# Patient Record
Sex: FEMALE | Race: WHITE | NOT HISPANIC OR LATINO | Employment: FULL TIME | URBAN - NONMETROPOLITAN AREA
[De-identification: names, ages, dates, MRNs, and addresses within clinical notes are randomized per-mention and may not be internally consistent; named-entity substitution may affect disease eponyms.]

---

## 2017-12-06 ENCOUNTER — TRANSCRIBE ORDERS (OUTPATIENT)
Dept: ADMINISTRATIVE | Facility: HOSPITAL | Age: 62
End: 2017-12-06

## 2017-12-06 DIAGNOSIS — Z12.31 ENCOUNTER FOR SCREENING MAMMOGRAM FOR MALIGNANT NEOPLASM OF BREAST: Primary | ICD-10-CM

## 2017-12-06 DIAGNOSIS — Z13.820 SCREENING FOR OSTEOPOROSIS: ICD-10-CM

## 2017-12-21 ENCOUNTER — HOSPITAL ENCOUNTER (OUTPATIENT)
Dept: MAMMOGRAPHY | Facility: HOSPITAL | Age: 62
Discharge: HOME | End: 2017-12-21
Attending: OBSTETRICS & GYNECOLOGY | Admitting: OBSTETRICS & GYNECOLOGY

## 2017-12-21 ENCOUNTER — HOSPITAL ENCOUNTER (OUTPATIENT)
Dept: BONE DENSITY | Facility: HOSPITAL | Age: 62
Discharge: HOME | End: 2017-12-21
Attending: OBSTETRICS & GYNECOLOGY

## 2017-12-21 DIAGNOSIS — Z13.820 SCREENING FOR OSTEOPOROSIS: ICD-10-CM

## 2017-12-21 DIAGNOSIS — Z12.31 ENCOUNTER FOR SCREENING MAMMOGRAM FOR MALIGNANT NEOPLASM OF BREAST: ICD-10-CM

## 2017-12-21 PROCEDURE — G0202 SCR MAMMO BI INCL CAD: HCPCS

## 2017-12-21 PROCEDURE — 77063 BREAST TOMOSYNTHESIS BI: CPT

## 2017-12-21 PROCEDURE — 77080 DXA BONE DENSITY AXIAL: CPT

## 2018-12-20 ENCOUNTER — OFFICE VISIT (OUTPATIENT)
Dept: OTOLARYNGOLOGY | Facility: CLINIC | Age: 63
End: 2018-12-20

## 2018-12-20 VITALS
WEIGHT: 137 LBS | BODY MASS INDEX: 25.21 KG/M2 | DIASTOLIC BLOOD PRESSURE: 90 MMHG | SYSTOLIC BLOOD PRESSURE: 158 MMHG | HEIGHT: 62 IN | HEART RATE: 78 BPM | TEMPERATURE: 97.7 F

## 2018-12-20 DIAGNOSIS — K21.9 LARYNGOPHARYNGEAL REFLUX (LPR): Primary | ICD-10-CM

## 2018-12-20 DIAGNOSIS — R07.0 THROAT PAIN: ICD-10-CM

## 2018-12-20 DIAGNOSIS — R09.89 GLOBUS SENSATION: ICD-10-CM

## 2018-12-20 PROCEDURE — 99203 OFFICE O/P NEW LOW 30 MIN: CPT | Performed by: NURSE PRACTITIONER

## 2018-12-20 PROCEDURE — 31575 DIAGNOSTIC LARYNGOSCOPY: CPT | Performed by: NURSE PRACTITIONER

## 2018-12-20 RX ORDER — LEVOTHYROXINE SODIUM 0.1 MG/1
TABLET ORAL
COMMUNITY
Start: 2018-11-30 | End: 2020-05-05 | Stop reason: DRUGHIGH

## 2018-12-20 RX ORDER — OMEPRAZOLE 40 MG/1
CAPSULE, DELAYED RELEASE ORAL
Qty: 30 CAPSULE | Refills: 11 | Status: SHIPPED | OUTPATIENT
Start: 2018-12-20 | End: 2020-05-05 | Stop reason: ALTCHOICE

## 2018-12-20 NOTE — PROGRESS NOTES
YOB: 1955  Location: [facility]  Location Address: [address] [place] [address], [facility], [address] [place] [address]  Location Phone: [phone]    Chief Complaint   Patient presents with   • Difficulty Swallowing       History of Present Illness  Marely Luis is a 63 y.o. female.  Marely Luis is here for evaluation of ENT complaints. The patient has had problems with throat complaints, a sore throat and throat pain  The symptoms are not localized to a particular location. The patient has had moderate symptoms. The symptoms have been present for the last several months The symptoms are aggravated by  no identifiable factors. The symptoms are improved by no identifiable factors. She had a tooth pulled then started on Augmentin. She has developed since then a lot of throat irritation on left side.  She has a lengthy history of reflux and is off meds at this time.  Hx of endo last one two years ago Teja.  She denies any other obvious symptoms at this time.       Past Medical History:   Diagnosis Date   • Hypothyroid    • Irregular heart beat        Past Surgical History:   Procedure Laterality Date   • THYROID SURGERY      white   • TOOTH EXTRACTION         Outpatient Medications Marked as Taking for the 18 encounter (Office Visit) with Valencia Mcnamara APRN   Medication Sig Dispense Refill   • levothyroxine (SYNTHROID, LEVOTHROID) 100 MCG tablet          Quinidine and Sulfa antibiotics    Family History   Problem Relation Age of Onset   • Breast cancer Neg Hx        Social History     Socioeconomic History   • Marital status:      Spouse name: Not on file   • Number of children: Not on file   • Years of education: Not on file   • Highest education level: Not on file   Social Needs   • Financial resource strain: Not on file   • Food insecurity - worry: Not on file   • Food insecurity - inability: Not on file   • Transportation needs - medical: Not on file   • Transportation needs - non-medical: Not on  file   Occupational History   • Not on file   Tobacco Use   • Smoking status: Never Smoker   • Smokeless tobacco: Never Used   Substance and Sexual Activity   • Alcohol use: Yes   • Drug use: Defer   • Sexual activity: Not on file   Other Topics Concern   • Not on file   Social History Narrative   • Not on file       Review of Systems   Constitutional: Negative.    HENT:        SEE HPI   Eyes: Negative.    Respiratory: Negative.    Cardiovascular: Negative.    Gastrointestinal: Negative.    Endocrine: Negative.    Genitourinary: Negative.    Musculoskeletal: Negative.    Skin: Negative.    Allergic/Immunologic: Negative.    Neurological: Negative.    Hematological: Negative.    Psychiatric/Behavioral: Negative.        Vitals:    12/20/18 1403   BP: 158/90   Pulse: 78   Temp: 97.7 °F (36.5 °C)       Body mass index is 25.06 kg/m².    Objective     Physical Exam  CONSTITUTIONAL: well nourished, alert, oriented, in no acute distress     COMMUNICATION AND VOICE: able to communicate normally, normal voice quality    HEAD: normocephalic, no lesions, atraumatic, no tenderness, no masses     FACE: appearance normal, no lesions, no tenderness, no deformities, facial motion symmetric    SALIVARY GLANDS: parotid glands with no tenderness, no swelling, no masses, submandibular glands with normal size, nontender    EYES: ocular motility normal, eyelids normal, orbits normal, no proptosis, conjunctiva normal , pupils equal, round     EARS:  Hearing: response to conversational voice normal bilaterally   External Ears: auricles without lesions  Otoscopic: tympanic membrane appearance normal, no lesions, no perforation, normal mobility, no fluid    NOSE:  External Nose: structure normal, no tenderness on palpation, no nasal discharge, no lesions, no evidence of trauma, nostrils patent   Intranasal Exam: nasal mucosa normal, vestibule within normal limits, inferior turbinate normal, nasal septum midline     ORAL:  Lips: upper and  lower lips without lesion   Teeth: dentition within normal limits for age   Gums: gingivae healthy   Oral Mucosa: oral mucosa normal, no mucosal lesions   Floor of Mouth: Warthin’s duct patent, mucosa normal  Tongue: lingual mucosa normal without lesions, normal tongue mobility   Palate: soft and hard palates with normal mucosa and structure  Oropharynx: noted cryptic tonsils 2+ no obvious exudate or grossly obvious abnormalities    HYPOPHARYNX:   LARYNX: see scope    NECK: neck appearance normal, no mass,  noted without erythema or tenderness    LYMPH NODES: no lymphadenopathy    CHEST/RESPIRATORY: respiratory effort normal,    CARDIOVASCULAR: extremities without cyanosis or edema      NEUROLOGIC/PSYCHIATRIC: oriented to time, place and person, mood normal, affect appropriate, CN II-XII intact grossly    Assessment/Plan   Marely was seen today for difficulty swallowing.    Diagnoses and all orders for this visit:    Laryngopharyngeal reflux (LPR)    Globus sensation    Throat pain    Other orders  -     omeprazole (priLOSEC) 40 MG capsule; Take 40 mg by mouth 30 minute prior to breakfast      * Surgery not found *  No orders of the defined types were placed in this encounter.    Return in about 3 months (around 3/20/2019).       Patient Instructions   Gastroesophageal Reflux Disease, Adult  Normally, food travels down the esophagus and stays in the stomach to be digested. However, when a person has gastroesophageal reflux disease (GERD), food and stomach acid move back up into the esophagus. When this happens, the esophagus becomes sore and inflamed. Over time, GERD can create small holes (ulcers) in the lining of the esophagus.  What are the causes?  This condition is caused by a problem with the muscle between the esophagus and the stomach (lower esophageal sphincter, or LES). Normally, the LES muscle closes after food passes through the esophagus to the stomach. When the LES is weakened or abnormal, it does not  close properly, and that allows food and stomach acid to go back up into the esophagus. The LES can be weakened by certain dietary substances, medicines, and medical conditions, including:  · Tobacco use.  · Pregnancy.  · Having a hiatal hernia.  · Heavy alcohol use.  · Certain foods and beverages, such as coffee, chocolate, onions, and peppermint.    What increases the risk?  This condition is more likely to develop in:  · People who have an increased body weight.  · People who have connective tissue disorders.  · People who use NSAID medicines.    What are the signs or symptoms?  Symptoms of this condition include:  · Heartburn.  · Difficult or painful swallowing.  · The feeling of having a lump in the throat.  · A bitter taste in the mouth.  · Bad breath.  · Having a large amount of saliva.  · Having an upset or bloated stomach.  · Belching.  · Chest pain.  · Shortness of breath or wheezing.  · Ongoing (chronic) cough or a night-time cough.  · Wearing away of tooth enamel.  · Weight loss.    Different conditions can cause chest pain. Make sure to see your health care provider if you experience chest pain.  How is this diagnosed?  Your health care provider will take a medical history and perform a physical exam. To determine if you have mild or severe GERD, your health care provider may also monitor how you respond to treatment. You may also have other tests, including:  · An endoscopy to examine your stomach and esophagus with a small camera.  · A test that measures the acidity level in your esophagus.  · A test that measures how much pressure is on your esophagus.  · A barium swallow or modified barium swallow to show the shape, size, and functioning of your esophagus.    How is this treated?  The goal of treatment is to help relieve your symptoms and to prevent complications. Treatment for this condition may vary depending on how severe your symptoms are. Your health care provider may recommend:  · Changes to  your diet.  · Medicine.  · Surgery.    Follow these instructions at home:  Diet  · Follow a diet as recommended by your health care provider. This may involve avoiding foods and drinks such as:  ? Coffee and tea (with or without caffeine).  ? Drinks that contain alcohol.  ? Energy drinks and sports drinks.  ? Carbonated drinks or sodas.  ? Chocolate and cocoa.  ? Peppermint and mint flavorings.  ? Garlic and onions.  ? Horseradish.  ? Spicy and acidic foods, including peppers, chili powder, chong powder, vinegar, hot sauces, and barbecue sauce.  ? Citrus fruit juices and citrus fruits, such as oranges, judith, and limes.  ? Tomato-based foods, such as red sauce, chili, salsa, and pizza with red sauce.  ? Fried and fatty foods, such as donuts, french fries, potato chips, and high-fat dressings.  ? High-fat meats, such as hot dogs and fatty cuts of red and white meats, such as rib eye steak, sausage, ham, and bui.  ? High-fat dairy items, such as whole milk, butter, and cream cheese.  · Eat small, frequent meals instead of large meals.  · Avoid drinking large amounts of liquid with your meals.  · Avoid eating meals during the 2-3 hours before bedtime.  · Avoid lying down right after you eat.  · Do not exercise right after you eat.  General instructions  · Pay attention to any changes in your symptoms.  · Take over-the-counter and prescription medicines only as told by your health care provider. Do not take aspirin, ibuprofen, or other NSAIDs unless your health care provider told you to do so.  · Do not use any tobacco products, including cigarettes, chewing tobacco, and e-cigarettes. If you need help quitting, ask your health care provider.  · Wear loose-fitting clothing. Do not wear anything tight around your waist that causes pressure on your abdomen.  · Raise (elevate) the head of your bed 6 inches (15cm).  · Try to reduce your stress, such as with yoga or meditation. If you need help reducing stress, ask your  health care provider.  · If you are overweight, reduce your weight to an amount that is healthy for you. Ask your health care provider for guidance about a safe weight loss goal.  · Keep all follow-up visits as told by your health care provider. This is important.  Contact a health care provider if:  · You have new symptoms.  · You have unexplained weight loss.  · You have difficulty swallowing, or it hurts to swallow.  · You have wheezing or a persistent cough.  · Your symptoms do not improve with treatment.  · You have a hoarse voice.  Get help right away if:  · You have pain in your arms, neck, jaw, teeth, or back.  · You feel sweaty, dizzy, or light-headed.  · You have chest pain or shortness of breath.  · You vomit and your vomit looks like blood or coffee grounds.  · You faint.  · Your stool is bloody or black.  · You cannot swallow, drink, or eat.  This information is not intended to replace advice given to you by your health care provider. Make sure you discuss any questions you have with your health care provider.  Document Released: 09/27/2006 Document Revised: 05/17/2017 Document Reviewed: 04/13/2016  [employer] Interactive Patient Education © 2018 [employer]

## 2018-12-20 NOTE — PATIENT INSTRUCTIONS
Gastroesophageal Reflux Disease, Adult  Normally, food travels down the esophagus and stays in the stomach to be digested. However, when a person has gastroesophageal reflux disease (GERD), food and stomach acid move back up into the esophagus. When this happens, the esophagus becomes sore and inflamed. Over time, GERD can create small holes (ulcers) in the lining of the esophagus.  What are the causes?  This condition is caused by a problem with the muscle between the esophagus and the stomach (lower esophageal sphincter, or LES). Normally, the LES muscle closes after food passes through the esophagus to the stomach. When the LES is weakened or abnormal, it does not close properly, and that allows food and stomach acid to go back up into the esophagus. The LES can be weakened by certain dietary substances, medicines, and medical conditions, including:  · Tobacco use.  · Pregnancy.  · Having a hiatal hernia.  · Heavy alcohol use.  · Certain foods and beverages, such as coffee, chocolate, onions, and peppermint.    What increases the risk?  This condition is more likely to develop in:  · People who have an increased body weight.  · People who have connective tissue disorders.  · People who use NSAID medicines.    What are the signs or symptoms?  Symptoms of this condition include:  · Heartburn.  · Difficult or painful swallowing.  · The feeling of having a lump in the throat.  · A bitter taste in the mouth.  · Bad breath.  · Having a large amount of saliva.  · Having an upset or bloated stomach.  · Belching.  · Chest pain.  · Shortness of breath or wheezing.  · Ongoing (chronic) cough or a night-time cough.  · Wearing away of tooth enamel.  · Weight loss.    Different conditions can cause chest pain. Make sure to see your health care provider if you experience chest pain.  How is this diagnosed?  Your health care provider will take a medical history and perform a physical exam. To determine if you have mild or severe  GERD, your health care provider may also monitor how you respond to treatment. You may also have other tests, including:  · An endoscopy to examine your stomach and esophagus with a small camera.  · A test that measures the acidity level in your esophagus.  · A test that measures how much pressure is on your esophagus.  · A barium swallow or modified barium swallow to show the shape, size, and functioning of your esophagus.    How is this treated?  The goal of treatment is to help relieve your symptoms and to prevent complications. Treatment for this condition may vary depending on how severe your symptoms are. Your health care provider may recommend:  · Changes to your diet.  · Medicine.  · Surgery.    Follow these instructions at home:  Diet  · Follow a diet as recommended by your health care provider. This may involve avoiding foods and drinks such as:  ? Coffee and tea (with or without caffeine).  ? Drinks that contain alcohol.  ? Energy drinks and sports drinks.  ? Carbonated drinks or sodas.  ? Chocolate and cocoa.  ? Peppermint and mint flavorings.  ? Garlic and onions.  ? Horseradish.  ? Spicy and acidic foods, including peppers, chili powder, chong powder, vinegar, hot sauces, and barbecue sauce.  ? Citrus fruit juices and citrus fruits, such as oranges, judith, and limes.  ? Tomato-based foods, such as red sauce, chili, salsa, and pizza with red sauce.  ? Fried and fatty foods, such as donuts, french fries, potato chips, and high-fat dressings.  ? High-fat meats, such as hot dogs and fatty cuts of red and white meats, such as rib eye steak, sausage, ham, and bui.  ? High-fat dairy items, such as whole milk, butter, and cream cheese.  · Eat small, frequent meals instead of large meals.  · Avoid drinking large amounts of liquid with your meals.  · Avoid eating meals during the 2-3 hours before bedtime.  · Avoid lying down right after you eat.  · Do not exercise right after you eat.  General  instructions  · Pay attention to any changes in your symptoms.  · Take over-the-counter and prescription medicines only as told by your health care provider. Do not take aspirin, ibuprofen, or other NSAIDs unless your health care provider told you to do so.  · Do not use any tobacco products, including cigarettes, chewing tobacco, and e-cigarettes. If you need help quitting, ask your health care provider.  · Wear loose-fitting clothing. Do not wear anything tight around your waist that causes pressure on your abdomen.  · Raise (elevate) the head of your bed 6 inches (15cm).  · Try to reduce your stress, such as with yoga or meditation. If you need help reducing stress, ask your health care provider.  · If you are overweight, reduce your weight to an amount that is healthy for you. Ask your health care provider for guidance about a safe weight loss goal.  · Keep all follow-up visits as told by your health care provider. This is important.  Contact a health care provider if:  · You have new symptoms.  · You have unexplained weight loss.  · You have difficulty swallowing, or it hurts to swallow.  · You have wheezing or a persistent cough.  · Your symptoms do not improve with treatment.  · You have a hoarse voice.  Get help right away if:  · You have pain in your arms, neck, jaw, teeth, or back.  · You feel sweaty, dizzy, or light-headed.  · You have chest pain or shortness of breath.  · You vomit and your vomit looks like blood or coffee grounds.  · You faint.  · Your stool is bloody or black.  · You cannot swallow, drink, or eat.  This information is not intended to replace advice given to you by your health care provider. Make sure you discuss any questions you have with your health care provider.  Document Released: 09/27/2006 Document Revised: 05/17/2017 Document Reviewed: 04/13/2016  [employer] Interactive Patient Education © 2018 [employer]

## 2019-10-17 ENCOUNTER — TRANSCRIBE ORDERS (OUTPATIENT)
Dept: ADMINISTRATIVE | Facility: HOSPITAL | Age: 64
End: 2019-10-17

## 2019-10-17 DIAGNOSIS — R51.9 NONINTRACTABLE HEADACHE, UNSPECIFIED CHRONICITY PATTERN, UNSPECIFIED HEADACHE TYPE: Primary | ICD-10-CM

## 2019-10-21 ENCOUNTER — HOSPITAL ENCOUNTER (OUTPATIENT)
Dept: MRI IMAGING | Facility: HOSPITAL | Age: 64
Discharge: HOME | End: 2019-10-21
Admitting: FAMILY MEDICINE

## 2019-10-21 PROCEDURE — 70551 MRI BRAIN STEM W/O DYE: CPT

## 2020-05-05 ENCOUNTER — OFFICE VISIT (OUTPATIENT)
Dept: OTOLARYNGOLOGY | Facility: CLINIC | Age: 65
End: 2020-05-05

## 2020-05-05 ENCOUNTER — TELEPHONE (OUTPATIENT)
Dept: OTOLARYNGOLOGY | Facility: CLINIC | Age: 65
End: 2020-05-05

## 2020-05-05 VITALS — WEIGHT: 124.8 LBS | TEMPERATURE: 97.3 F | BODY MASS INDEX: 22.97 KG/M2 | HEIGHT: 62 IN

## 2020-05-05 DIAGNOSIS — J35.01 CHRONIC TONSILLITIS: ICD-10-CM

## 2020-05-05 DIAGNOSIS — K21.9 LARYNGOPHARYNGEAL REFLUX (LPR): Primary | ICD-10-CM

## 2020-05-05 DIAGNOSIS — R09.89 GLOBUS SENSATION: ICD-10-CM

## 2020-05-05 DIAGNOSIS — R07.0 THROAT PAIN: ICD-10-CM

## 2020-05-05 DIAGNOSIS — J30.0 VASOMOTOR RHINITIS: ICD-10-CM

## 2020-05-05 PROCEDURE — 31575 DIAGNOSTIC LARYNGOSCOPY: CPT | Performed by: OTOLARYNGOLOGY

## 2020-05-05 PROCEDURE — 99203 OFFICE O/P NEW LOW 30 MIN: CPT | Performed by: OTOLARYNGOLOGY

## 2020-05-05 RX ORDER — PANTOPRAZOLE SODIUM 40 MG/1
40 TABLET, DELAYED RELEASE ORAL DAILY
COMMUNITY
Start: 2020-05-01

## 2020-05-05 RX ORDER — SUCRALFATE 1 G/1
1 TABLET ORAL
Qty: 60 TABLET | Refills: 1 | Status: SHIPPED | OUTPATIENT
Start: 2020-05-05

## 2020-05-05 RX ORDER — LEVOTHYROXINE SODIUM 88 UG/1
88 TABLET ORAL
COMMUNITY
Start: 2020-01-22

## 2020-05-05 RX ORDER — SUCRALFATE 1 G/1
1 TABLET ORAL 4 TIMES DAILY
Qty: 60 TABLET | Refills: 1 | Status: SHIPPED | OUTPATIENT
Start: 2020-05-05 | End: 2020-05-05 | Stop reason: ALTCHOICE

## 2020-05-05 RX ORDER — AMOXICILLIN AND CLAVULANATE POTASSIUM 875; 125 MG/1; MG/1
1 TABLET, FILM COATED ORAL EVERY 12 HOURS
Qty: 20 TABLET | Refills: 0 | Status: SHIPPED | OUTPATIENT
Start: 2020-05-05 | End: 2020-05-07 | Stop reason: ALTCHOICE

## 2020-05-05 RX ORDER — IPRATROPIUM BROMIDE 42 UG/1
2 SPRAY, METERED NASAL 3 TIMES DAILY
Qty: 15 ML | Refills: 5 | Status: SHIPPED | OUTPATIENT
Start: 2020-05-05

## 2020-05-05 NOTE — PROGRESS NOTES
Subjective   Marely Luis is a 64 y.o. female.   Throat problem  History of Present Illness   Patient is had a problem with her throat for couple of months she has been treated with Anaprox will just last few days is starting to help she has been on Prilosec in the past she has a history of thyroid problems are treated by others she had thyroid surgery a few years ago ENT in another town.  She recently went to the emergency room because her symptoms were bothered her so much after she had had sore throat which she says worse on the right and was had a CBC and thyroid testing which is slightly normal abnormal the CBC was normal she had a CT scan of the neck which was normal as well she has discomfort more on the right side though is not severe she is not having hemoptysis she also has postnasal drip she is to start on Protonix she was given doxycycline which exacerbated her reflux symptoms she describes fullness in her throat no fever chills no adenopathy occasional raspiness but no severe sore throat throat associated with voice problems      The following portions of the patient's history were reviewed and updated as appropriate: allergies, current medications, past family history, past medical history, past social history, past surgical history and problem list.      Marely Luis reports that she has never smoked. She has never used smokeless tobacco. She reports that she drinks alcohol. She reports that she does not use drugs.  Patient is not a tobacco user and has not been counseled for use of tobacco products    Family History   Problem Relation Age of Onset   • Diabetes Other    • Heart disease Other    • Thyroid disease Other    • Breast cancer Neg Hx          Current Outpatient Medications:   •  levothyroxine (SYNTHROID, LEVOTHROID) 88 MCG tablet, Take 88 mcg by mouth., Disp: , Rfl:   •  pantoprazole (PROTONIX) 40 MG EC tablet, Take 40 mg by mouth Daily., Disp: , Rfl:   •  amoxicillin-clavulanate (AUGMENTIN)  875-125 MG per tablet, Take 1 tablet by mouth Every 12 (Twelve) Hours., Disp: 20 tablet, Rfl: 0  •  ipratropium (ATROVENT) 0.06 % nasal spray, 2 sprays into the nostril(s) as directed by provider 3 (Three) Times a Day., Disp: 15 mL, Rfl: 5  •  sucralfate (CARAFATE) 1 g tablet, Take 1 tablet by mouth 4 (Four) Times a Day., Disp: 60 tablet, Rfl: 1    Allergies   Allergen Reactions   • Quinidine Arrhythmia   • Sulfa Antibiotics Arrhythmia       Past Medical History:   Diagnosis Date   • GERD (gastroesophageal reflux disease)    • Globus sensation    • History of blood clots    • Hypothyroid    • Irregular heart beat    • Laryngopharyngeal reflux (LPR)        Past Surgical History:   Procedure Laterality Date   • POLYPECTOMY     • THYROID SURGERY      white   • TOOTH EXTRACTION         Review of Systems   Constitutional: Negative for fever.   HENT: Positive for ear pain, postnasal drip, sore throat, trouble swallowing and voice change.         Continuous Burping    Respiratory: Negative for stridor.    Cardiovascular: Negative for chest pain.   Endocrine: Positive for cold intolerance.   Musculoskeletal: Positive for neck pain.   Neurological: Positive for numbness.   Hematological: Negative for adenopathy.   All other systems reviewed and are negative.          Objective   Physical Exam   Constitutional: She appears well-developed and well-nourished.   HENT:   Head: Normocephalic.   Right Ear: Hearing, tympanic membrane, external ear and ear canal normal.   Left Ear: Hearing, tympanic membrane, external ear and ear canal normal.   Ears:    Nose: Mucosal edema present.   Mouth/Throat: Uvula is midline, oropharynx is clear and moist and mucous membranes are normal. Tonsils are 3+ on the right. Tonsils are 1+ on the left. No tonsillar exudate.       Neck: Trachea normal and phonation normal. Neck supple. No tracheal tenderness present. No tracheal deviation present. No thyromegaly present.       Pulmonary/Chest: Effort  normal.   Musculoskeletal: Normal range of motion.   Lymphadenopathy:     She has no cervical adenopathy.   Neurological: She is alert.   Skin: Skin is warm.   Psychiatric: She has a normal mood and affect. Thought content normal.   Nursing note and vitals reviewed.    CT report was reviewed there is no copy since is an outside institution there is no abnormality noted including adenopathy mass or asymmetry    Procedure Note    Pre-operative Diagnosis:   Chief Complaint   Patient presents with   • Sore Throat   • Fullness Sensation In Throat   • Post Nasal Drip       Post-operative Diagnosis: same    Anesthesia: topical with xylocaine and neosynephrine    Endoscopy Type:  Flexible Laryngoscopy    Procedure Details:    The patient was placed in the sitting position.  After topical anesthesia and decongestion, the 4 mm laryngoscope was passed.  The oropharynx, hypopharynx, and larynx were all examined.  Vocal cords were examined during respiration and phonation.  The following findings were noted:    Findings: Previously noted nasal findings were confirmed. Nasopharynx without mass, hypopharynx and larynx without evidence of neoplasm. Vocal cord mobility intact. There is chronic appearing edema and erythema of the laryngeal structures consistent with chronic laryngitis.  Right tonsil is larger than left with irritation and tenderness    Condition:  Stable.  Patient tolerated procedure well.    Complications:  None    Assessment/Plan   Marely was seen today for sore throat, fullness sensation in throat and post nasal drip.    Diagnoses and all orders for this visit:    Laryngopharyngeal reflux (LPR)    Globus sensation    Throat pain    Vasomotor rhinitis    Chronic tonsillitis    Other orders  -     amoxicillin-clavulanate (AUGMENTIN) 875-125 MG per tablet; Take 1 tablet by mouth Every 12 (Twelve) Hours.  -     ipratropium (ATROVENT) 0.06 % nasal spray; 2 sprays into the nostril(s) as directed by provider 3 (Three)  Times a Day.  -     sucralfate (CARAFATE) 1 g tablet; Take 1 tablet by mouth 4 (Four) Times a Day.    Think the patient has a combination of 3 problems we will treat her vasomotor rhinitis her right tonsillitis and add Carafate to her Protonix she not been on it long and if to treat her reflux  Discussed with her the importance of reflux precautions showed her how to use the medication discussed importance of probiotics how to take the antibiotic with food and the Protonix without food she is to see us back in 4 weeks if she has any questions or problems she is to let us know otherwise we will follow-up I reviewed the CT report from outside institution

## 2020-05-05 NOTE — TELEPHONE ENCOUNTER
05/05/2020, 1448 - Patient telephone call returned per this staff member [phone].  Patient offered Good RX Prescription Drug Savings Card due to patient request for more cost effective alternate prescription medication for Ipratropium (Atrovent) 0.06% Nasal Spray.  Patient accepted offer and requested savings card to be mailed due to lengthy drive required to return to clinic and collect savings card in person.  Patient mailing address confirmed - [address], [place] [address].  Savings card placed in mail as of today, Tuesday, May 5, 2020.  Patient requested clarification regarding amount of prescription medication Sucralfate (Carafate) 1 GM Tablet to utilize as prescription medication bottle states 1 tablet by mouth 4 times per day, however, these were not the instructions received per Dr. Ashish Guerra M.D./ENT.  Patient made aware she is to utilize 1 tablet by mouth nightly at bedtime, and may utilize a second dose after dinner if zero improvement in symptoms.  Patient verbalized understanding.    05/05/2020, 1453 - Patient's pharmacy of choice,  Pharmacy, [place], [place] telephoned per this staff member [phone].  Spoke with PharmacistLiborio.  Liborio made aware of incorrect instructions of 1 tablet by mouth 4 times per day placed on Sucralfate (Carafate) 1 GM Tablet prescription medication bottle.  Pharmacist made aware correct instructions is 1 tablet by mouth nightly at bedtime, with second dose after dinner if zero improvement in symptoms.  Pharmacist stated the prescription medication directions received per  Pharmacy is 1 tablet by mouth 4 times per day.  Pharmacist made aware 1 tablet by mouth 4 times per day directions discontinued per Dr. Ashish Guerra M.D./ENT with resubmission of corrected directions of 1 tablet by mouth nightly at bedtime, with second dose after dinner if zero improvement in symptoms.  Pharmacist stated alternate directions not received.  Pharmacist made aware of  receipt confirmed per pharmacy regarding correct directions on 05/05/2020 at 1444.  Pharmacist made aware this staff member has spoken with patient and provided clarification of how to correctly utilize prescription medication.  Pharmacist offered to provide patient with alternate label.  Patient made aware.

## 2020-05-05 NOTE — TELEPHONE ENCOUNTER
----- Message from Christie Cobian sent at 5/5/2020  1:52 PM CDT -----  Regarding: RX   Contact: [phone]  Pharmacy called about her nasal spray, they are wanting to know if they could substitute for something cheaper. [phone] Liborio     As I was typing this patient called with questions about her prescription carafate for 4 times a day. She was wanting to know if she understood to take it that many times a day.     Thank you

## 2020-05-05 NOTE — PATIENT INSTRUCTIONS
MyPlate from USDA    MyPlate is an outline of a general healthy diet based on the 2010 Dietary Guidelines for Americans, from the U.S. Department of Agriculture (USDA). It sets guidelines for how much food you should eat from each food group based on your age, sex, and level of physical activity.  What are tips for following MyPlate?  To follow MyPlate recommendations:  · Eat a wide variety of fruits and vegetables, grains, and protein foods.  · Serve smaller portions and eat less food throughout the day.  · Limit portion sizes to avoid overeating.  · Enjoy your food.  · Get at least 150 minutes of exercise every week. This is about 30 minutes each day, 5 or more days per week.  It can be difficult to have every meal look like MyPlate. Think about MyPlate as eating guidelines for an entire day, rather than each individual meal.  Fruits and vegetables  · Make half of your plate fruits and vegetables.  · Eat many different colors of fruits and vegetables each day.  · For a 2,000 calorie daily food plan, eat:  ? 2½ cups of vegetables every day.  ? 2 cups of fruit every day.  · 1 cup is equal to:  ? 1 cup raw or cooked vegetables.  ? 1 cup raw fruit.  ? 1 medium-sized orange, apple, or banana.  ? 1 cup 100% fruit or vegetable juice.  ? 2 cups raw leafy greens, such as lettuce, spinach, or kale.  ? ½ cup dried fruit.  Grains  · One fourth of your plate should be grains.  · Make at least half of the grains you eat each day whole grains.  · For a 2,000 calorie daily food plan, eat 6 oz of grains every day.  · 1 oz is equal to:  ? 1 slice bread.  ? 1 cup cereal.  ? ½ cup cooked rice, cereal, or pasta.  Protein  · One fourth of your plate should be protein.  · Eat a wide variety of protein foods, including meat, poultry, fish, eggs, beans, nuts, and tofu.  · For a 2,000 calorie daily food plan, eat 5½ oz of protein every day.  · 1 oz is equal to:  ? 1 oz meat, poultry, or fish.  ? ¼ cup cooked beans.  ? 1 egg.  ? ½ oz nuts  or seeds.  ? 1 Tbsp peanut butter.  Dairy  · Drink fat-free or low-fat (1%) milk.  · Eat or drink dairy as a side to meals.  · For a 2,000 calorie daily food plan, eat or drink 3 cups of dairy every day.  · 1 cup is equal to:  ? 1 cup milk, yogurt, cottage cheese, or soy milk (soy beverage).  ? 2 oz processed cheese.  ? 1½ oz natural cheese.  Fats, oils, salt, and sugars  · Only small amounts of oils are recommended.  · Avoid foods that are high in calories and low in nutritional value (empty calories), like foods high in fat or added sugars.  · Choose foods that are low in salt (sodium). Choose foods that have less than 140 milligrams (mg) of sodium per serving.  · Drink water instead of sugary drinks. Drink enough water each day to keep your urine pale yellow.  Where to find support  · Work with your health care provider or a nutrition specialist (dietitian) to develop a customized eating plan that is right for you.  · Download an godwin (mobile application) to help you track your daily food intake.  Where to find more information  · Go to ChooseMyPlate.gov for more information.  · Learn more and log your daily food intake according to MyPlate using USDA's SuperTracker: www.[employer].usda.gov  Summary  · MyPlate is a general guideline for healthy eating from the USDA. It is based on the 2010 Dietary Guidelines for Americans.  · In general, fruits and vegetables should take up ½ of your plate, grains should take up ¼ of your plate, and protein should take up ¼ of your plate.  This information is not intended to replace advice given to you by your health care provider. Make sure you discuss any questions you have with your health care provider.  Document Released: 01/06/2009 Document Revised: 03/19/2018 Document Reviewed: 03/19/2018  Patients Know Best Interactive Patient Education © 2020 [employer]

## 2020-05-06 ENCOUNTER — TELEPHONE (OUTPATIENT)
Dept: OTOLARYNGOLOGY | Facility: CLINIC | Age: 65
End: 2020-05-06

## 2020-05-06 NOTE — TELEPHONE ENCOUNTER
05/06/2020, 1335 - Patient telephoned per this staff member [phone] with notification of verbal response received per Dr. Ashish Guerra M.D./ENT regarding patient inquiry if prescription medicaiton Amoxicillin-Clavulanate (Augmentin) 875-125 Tablets, 1 tablet by mouth 2 times per day, #20, 0 renewals would be the culprit of the fatigue she is experiencing.  Per M.D./ENT Charlie, no, prescription medication would not be culprit of patient's fatigue, more than likely, it is the illness being treated with the prescription medication that would be the culprit of patient's fatigue.  Patient verbalized understanding and further stated she does not have a fever.

## 2020-05-06 NOTE — TELEPHONE ENCOUNTER
----- Message from Christie Cobian sent at 5/6/2020 12:51 PM CDT -----  Regarding: rx ?   Contact: [phone]  Wanted to ask a question about her rx augmentin? Wants to know if it will cause fatigue?   If you would please give her a call.     Thank you

## 2020-05-07 ENCOUNTER — TELEPHONE (OUTPATIENT)
Dept: OTOLARYNGOLOGY | Facility: CLINIC | Age: 65
End: 2020-05-07

## 2020-05-07 RX ORDER — CEFDINIR 300 MG/1
300 CAPSULE ORAL 2 TIMES DAILY
Qty: 20 CAPSULE | Refills: 0 | Status: SHIPPED | OUTPATIENT
Start: 2020-05-07

## 2020-05-07 NOTE — TELEPHONE ENCOUNTER
"05/07/2020, 1149 - Patient telephone call returned per this staff member [phone] regarding patient inquiry if she may have inability to utilize antibiotics as she utilized Doxycycline \"a few weeks ago\" which resulted in sore throat, and now she is unable to utilize Augmentin due to shortness of breath.  Patient made aware of verbal response received per Dr. Ashish Guerra M.D./ENT - Doxycycline and Augmentin are 2 different drugs; Cefdinir (Omnicef) 300 MG Capsules, 1 capsule by mouth 2 times per day and Sucralfate (Carafate) 1 GM Tablets, 1 tablet by mouth nightly at bedtime with second dose after dinner if zero improvement in symptoms are his recommendations as treatment for patient's diagnoses.  Patient instructed to contact office should she experience reaction with Omnicef.  Patient made aware of need to include addition of an over the counter probiotic of patient's choice and/or consumption of yogurt while utilizing antibiotics to promote continued good health of gastrointestinal tract. Patient verbalized understanding.    "

## 2020-05-07 NOTE — TELEPHONE ENCOUNTER
----- Message from Adrianna Chang sent at 5/7/2020  8:23 AM CDT -----  Contact: [phone]  Having trouble taking Augmentin and Carafate.  She thinks it is mainly the Augmentin.  Asking if there was anything else she could take.    [facility].

## 2020-05-07 NOTE — TELEPHONE ENCOUNTER
"05/07/2020, 0858 - Patient telephoned per this staff member [phone] with request for clarification regarding patient statement \"having trouble taking Augmentin and Carafate\".  Patient stated she is experiencing difficulty in swallowing prescription medications, shortness of breath between the hours of 7:00 P.M. - 10:00 P.M. after utilizing Augmentin following evening meal, and reflux at 11:00 P.M. after utilizing Carafate 1 hour prior to sleep with belching.  Patient stated she is sleeping in [place]. Patient stated throat is not sore this A.M., however, mucus remains in back of throat.  Patient stated she has utilized prescription medications Pantoprazole (Protonix) 40 MG Tablet and Levothyroxine (Synthroid) 88 MCG Tablet by mouth this A.M.      05/27/2020, 1102 - Patient telephoned per this staff member [phone] with notification of verbal orders received per Dr. Ashish Guerra M.D./ENT - Patient to cease utilization of Amoxicillin (Augmentin) 875-125 MG Tablets, 1 tablet by mouth 2 times per day and begin utilization of prescription medication Cefdinir (Omnicef) 300 MG Capsules, 1 capsule by mouth 2 times per day and continue utilization of Sucralfate (Carafate) 1 GM Tablet, 1 tablet by mouth nightly at bedtime, and add second dose after dinner if zero improvement in symptoms.  Patient made aware she may dissolve Sucralfate tablet  in 30 ML water for ease in ability to swallow.  Patient made aware she may pull apart Cefdinir capsule and place medication therein in 1 teaspoon applesauce or pudding for ease in swallowing.  Patient verbalized understanding and confirmed pharmacy of choice is  Pharmacy, [place], [place].  "

## 2020-05-11 ENCOUNTER — TELEPHONE (OUTPATIENT)
Dept: OTOLARYNGOLOGY | Facility: CLINIC | Age: 65
End: 2020-05-11

## 2020-05-11 NOTE — TELEPHONE ENCOUNTER
05/11/2020, 1552 - Patient telephoned per this staff member [phone] with notification of verbal orders received per Dr. David Pierson M.D./ENT - Unlikely Cifdinir (Omnicef) 300 MG Capsules culprit of throat symptoms; may hold utilization of prescription medication until after speaking with Gastroenterologist who can also address symptoms of nausea.  Patient verbalized understanding.  Patient requested this staff member to make note in chart her physician increased prescription medication Levothyroxine (Synthroid) from 88 MCG by mouth daily to 100 MCG by mouth daily.

## 2020-05-11 NOTE — TELEPHONE ENCOUNTER
----- Message from Adrianna Chang sent at 5/11/2020  1:56 PM CDT -----  Contact: [phone]  Would like for you to call her.

## 2020-05-11 NOTE — TELEPHONE ENCOUNTER
05/11/2020, 1401 - Patient telephone call returned per this staff member [phone].  Patient stated she is now experiencing diarrhea she describes as yellow in nature.  Patient made aware this staff member will seek advisement per Dr. David Pierson M.D./ENT regarding onset of diarrhea.  Patient verbalized understanding.

## 2020-05-11 NOTE — TELEPHONE ENCOUNTER
----- Message from David Pierson MD sent at 5/11/2020 11:17 AM CDT -----  Contact: [phone]  Rosalba, tell the patient if she is not actually vomiting to keep trying to take her medicine, and if she is not taking it with a little food have her take it with a little bit of food.  ----- Message -----  From: Chloe Segura  Sent: 5/11/2020   9:21 AM CDT  To: David Pierson MD, Adrianna Chang, #    ([place] pt) said she is feeling sick to stomach  wants to let you know. Needs to know what to do?

## 2020-05-27 ENCOUNTER — TELEPHONE (OUTPATIENT)
Dept: OTOLARYNGOLOGY | Facility: CLINIC | Age: 65
End: 2020-05-27

## 2020-05-27 NOTE — TELEPHONE ENCOUNTER
05/27/2020, 1454 - Patient telephoned per this staff member [phone].  Patient stated she cancelled 4 week clinical appointment with Dr. Ashish Guerra M.D/ENT scheduled Thursday, June 4, 2020 at 10:00 A.M. 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  Patient stated date/time of surgical procedure has not been scheduled as she must complete an Endo Flip prior to surgical procedure for insurance purposes.  Patient instructed to contact office to reschedule clinical appointment if needed.  Patient verbalized understanding.

## 2020-05-27 NOTE — TELEPHONE ENCOUNTER
----- Message from Chloe Segura sent at 5/27/2020  1:41 PM CDT -----  Contact: [phone]  Pt wanted me to tell you she is going to have surgery and will call back to reschedule with dr austin she she is better.

## 2021-12-07 NOTE — TELEPHONE ENCOUNTER
----- Message from David Pierson MD sent at 5/11/2020  3:46 PM CDT -----  Contact: [phone]  Tell her it is unlikely that the cefdinir is causing her throat symptoms but she may hold off taking it until she has seen the gastroenterologist.  The gastroenterologist should also be able to address her nausea symptoms that she complained of earlier.  ----- Message -----  From: Rosalba Vann LPN  Sent: 5/11/2020   1:48 PM CDT  To: David Pierson MD    05/11/2020, 1313 - Patient telephoned per this staff member [phone].  Patient made aware Dr. Ashish Guerra M.D./ENT will return to clinic Wednesday, May 13, 2020, however, he will be at [place], [place].  Patient made aware of verbal recommendation received per Dr. David Pierson M.D./ENT - Unless patient is actually vomiting, patient is encouraged to continue utilization of prescription medication which she is to take with small amount of food.  Patient verbalized understanding.  Patient stated she was well Saturday, May 09, 2020 and rested well Saturday evening/night, however, Jere, May 10, 2020 approximately 5 P.M., she began to experience throat tightening, queasiness, alternating sensations of being hot/cold, chest tightness, clogged nasal passages, weakness, and achy body.  Patient stated she is also experiencing increase in blood pressure.  Per patient, blood pressure this A.M. 162/94, however, patient denied having increased temperature.  Patient stated she is utilizing prescription medication with food and probiotic, however, she did not utilize prescription medication this A.M. due to general sensation of being unwell.  Patient stated throat is no longer sore, but sensation of mucus in throat remains.  Patient instructed to contact her Primary Care Physician as well regarding the symptoms she is experiencing, especially the increase in blood pressure and chest tightness or seek medical  care at Urgent Care or Emergency Department should these symptoms persist or worsen.  Patient verbalized understanding.      05/11/2020, 1344 - Patient stated she has a clinical appointment with a Dr. Melanie M.D./Gastroenterologist in [place], [place] tomorrow, Tuesday, May 12, 2020 regarding throat tightness.  Patient expressed concern if antibiotic, Cefdinir (Omnicef) 300 MG Capsules, 1 capsule by mouth 2 times per day X 10 days is culprit of symptoms.  Patient made aware this staff member will seek advisement per Dr. David Pierson M.D./ENT.    HELP  : )          ----- Message -----  From: David Pierson MD  Sent: 5/11/2020  11:17 AM CDT  To: Adrianna Farfan, #    Rosalba, tell the patient if she is not actually vomiting to keep trying to take her medicine, and if she is not taking it with a little food have her take it with a little bit of food.  ----- Message -----  From: Chloe Segura  Sent: 5/11/2020   9:21 AM CDT  To: David Pierson MD, Adrianna Chang, #    ([place] pt) said she is feeling sick to stomach  wants to let you know. Needs to know what to do?          No device.

## 2025-05-14 ENCOUNTER — TRANSCRIBE ORDERS (OUTPATIENT)
Dept: ADMINISTRATIVE | Facility: HOSPITAL | Age: 70
End: 2025-05-14
Payer: MEDICARE

## 2025-05-14 DIAGNOSIS — R55 SYNCOPE AND COLLAPSE: Primary | ICD-10-CM
